# Patient Record
Sex: FEMALE | Race: WHITE | NOT HISPANIC OR LATINO | Employment: STUDENT | URBAN - METROPOLITAN AREA
[De-identification: names, ages, dates, MRNs, and addresses within clinical notes are randomized per-mention and may not be internally consistent; named-entity substitution may affect disease eponyms.]

---
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
## 2025-04-30 ENCOUNTER — OFFICE VISIT (OUTPATIENT)
Dept: OTOLARYNGOLOGY | Facility: CLINIC | Age: 20
End: 2025-04-30
Payer: MEDICAID

## 2025-04-30 VITALS — WEIGHT: 182.75 LBS

## 2025-04-30 DIAGNOSIS — H73.891 RETRACTION OF TYMPANIC MEMBRANE OF RIGHT EAR: ICD-10-CM

## 2025-04-30 DIAGNOSIS — H69.91 DYSFUNCTION OF RIGHT EUSTACHIAN TUBE: ICD-10-CM

## 2025-04-30 DIAGNOSIS — H61.21 IMPACTED CERUMEN OF RIGHT EAR: ICD-10-CM

## 2025-04-30 DIAGNOSIS — H92.11 OTORRHEA OF RIGHT EAR: Primary | ICD-10-CM

## 2025-04-30 PROCEDURE — 87186 SC STD MICRODIL/AGAR DIL: CPT | Performed by: OTOLARYNGOLOGY

## 2025-04-30 PROCEDURE — 69210 REMOVE IMPACTED EAR WAX UNI: CPT | Mod: PBBFAC,PO | Performed by: OTOLARYNGOLOGY

## 2025-04-30 PROCEDURE — 99212 OFFICE O/P EST SF 10 MIN: CPT | Mod: PBBFAC,PO,25 | Performed by: OTOLARYNGOLOGY

## 2025-04-30 PROCEDURE — 69210 REMOVE IMPACTED EAR WAX UNI: CPT | Mod: S$PBB,,, | Performed by: OTOLARYNGOLOGY

## 2025-04-30 PROCEDURE — 1159F MED LIST DOCD IN RCRD: CPT | Mod: CPTII,,, | Performed by: OTOLARYNGOLOGY

## 2025-04-30 PROCEDURE — 99999 PR PBB SHADOW E&M-EST. PATIENT-LVL II: CPT | Mod: PBBFAC,,, | Performed by: OTOLARYNGOLOGY

## 2025-04-30 PROCEDURE — 99204 OFFICE O/P NEW MOD 45 MIN: CPT | Mod: 25,S$PBB,, | Performed by: OTOLARYNGOLOGY

## 2025-04-30 RX ORDER — NEOMYCIN SULFATE, POLYMYXIN B SULFATE, HYDROCORTISONE 3.5; 10000; 1 MG/ML; [USP'U]/ML; MG/ML
4 SOLUTION/ DROPS AURICULAR (OTIC) 3 TIMES DAILY
Qty: 10 ML | Refills: 0 | Status: SHIPPED | OUTPATIENT
Start: 2025-04-30 | End: 2025-05-14

## 2025-05-03 LAB — BACTERIA SPEC AEROBE CULT: ABNORMAL

## 2025-05-26 ENCOUNTER — TELEPHONE (OUTPATIENT)
Dept: OTOLARYNGOLOGY | Facility: CLINIC | Age: 20
End: 2025-05-26
Payer: MEDICAID

## 2025-05-26 ENCOUNTER — CLINICAL SUPPORT (OUTPATIENT)
Dept: AUDIOLOGY | Facility: CLINIC | Age: 20
End: 2025-05-26
Payer: MEDICAID

## 2025-05-26 ENCOUNTER — OFFICE VISIT (OUTPATIENT)
Dept: OTOLARYNGOLOGY | Facility: CLINIC | Age: 20
End: 2025-05-26
Payer: MEDICAID

## 2025-05-26 VITALS — WEIGHT: 180.75 LBS | BODY MASS INDEX: 31.03 KG/M2

## 2025-05-26 DIAGNOSIS — H90.11 CONDUCTIVE HEARING LOSS OF RIGHT EAR WITH UNRESTRICTED HEARING OF LEFT EAR: ICD-10-CM

## 2025-05-26 DIAGNOSIS — H71.91 CHOLESTEATOMA OF RIGHT EAR: Primary | ICD-10-CM

## 2025-05-26 DIAGNOSIS — H69.91 DYSFUNCTION OF RIGHT EUSTACHIAN TUBE: ICD-10-CM

## 2025-05-26 DIAGNOSIS — H73.891 RETRACTION OF TYMPANIC MEMBRANE OF RIGHT EAR: ICD-10-CM

## 2025-05-26 DIAGNOSIS — H90.0 CONDUCTIVE HEARING LOSS, BILATERAL: Primary | ICD-10-CM

## 2025-05-26 DIAGNOSIS — H92.11 OTORRHEA OF RIGHT EAR: ICD-10-CM

## 2025-05-26 PROCEDURE — 99999 PR PBB SHADOW E&M-EST. PATIENT-LVL II: CPT | Mod: PBBFAC,,, | Performed by: OTOLARYNGOLOGY

## 2025-05-26 PROCEDURE — 1159F MED LIST DOCD IN RCRD: CPT | Mod: CPTII,,, | Performed by: OTOLARYNGOLOGY

## 2025-05-26 PROCEDURE — 99214 OFFICE O/P EST MOD 30 MIN: CPT | Mod: S$PBB,,, | Performed by: OTOLARYNGOLOGY

## 2025-05-26 PROCEDURE — 92567 TYMPANOMETRY: CPT | Mod: PBBFAC,PO

## 2025-05-26 PROCEDURE — 92557 COMPREHENSIVE HEARING TEST: CPT | Mod: PBBFAC,PO

## 2025-05-26 PROCEDURE — 3008F BODY MASS INDEX DOCD: CPT | Mod: CPTII,,, | Performed by: OTOLARYNGOLOGY

## 2025-05-26 PROCEDURE — 99212 OFFICE O/P EST SF 10 MIN: CPT | Mod: PBBFAC,PO | Performed by: OTOLARYNGOLOGY

## 2025-05-26 NOTE — PROGRESS NOTES
Pediatric Otolaryngology- Head & Neck Surgery   Established Patient Visit        Chief Complaint: Follow up of ear    HPI  Myrna Vargas is a 20 y.o. old female here for follow up for  chronic draining ear on the right.  Seen 2 weeks ago. Had R TM retraction vs. Perforation filled with granulation> Has been on cortisporin. Otorrhea resolved. Hearing has improved.       Had a similar episode years ago and was seen by Dr. Hopson in 2018 and ct done, no cholesteatoma at that time, but had retracted TM.  There is  an associated subjective hearing loss.  There is no dizziness or facial weakness. Parents describe this problem as moderate    No frequent water exposure. No known trauma to the ear.   This has  not been previously cultured.   .  No other risk factors.    Prior ear surgery: tubes x 1    Medical History  Past Medical History:   Diagnosis Date    Anxiety     Depression        Surgical History  Past Surgical History:   Procedure Laterality Date    ADENOIDECTOMY  2008    APPENDECTOMY      TYMPANOSTOMY TUBE PLACEMENT         Medications  Medications Ordered Prior to Encounter[1]    Allergies  Review of patient's allergies indicates:  No Known Allergies    Social History  There are no smokers in the home    Family History  No family history of bleeding disorder or problems with anesthesia         Physical Exam  General:  Alert, well developed, comfortable  Voice:  Regular for age, good volume  Respiratory:  Symmetric breathing, no stridor, no distress  Head:  Normocephalic, no lesions  Face: Symmetric, HB 1/6 bilat, no lesions, no obvious sinus tenderness, salivary glands nontender  Eyes:  Sclera white, extraocular movements intact  Nose: Dorsum straight, septum midline, normal turbinate size, normal mucosa  Right Ear: see below  Left Ear: Pinna and external ear appears normal, EAC clear, TM  intact, mobile, without middle ear effusion  Hearing:  Grossly intact  Oral cavity: Healthy mucosa, no masses or lesions  including lips, teeth, gums, floor of mouth, palate, or tongue.  Oropharynx: Tonsils 1+, palate intact, normal pharyngeal wall movement  Neck: Supple, no palpable nodes, no masses, trachea midline, no thyroid masses  Cardiovascular system:  Pulses regular in both upper extremities, good skin turgor     Studies Reviewed  Ct temporal bone 2018: no cholesteatoma    Procedures  Microscopy:  Right Ear: Pinna and external ear appears normal, EAC clear, TM w perforation and retracted TM     Flexible fiberoptic nasopharyngoscopy  Surgeon:  Lemuel Koo MD     Detail:  After confirming patient and verbal consent, the nose was anesthetized with topical lidocaine and afrin.  The flexible fiberoptic endoscope was passed through the left nostril revealing normal turbinates. There was no pus or polyps in the nasal cavity. The sope was then advanced to the nasopharynx revealing non obstructive adenoid tissue.  The flexible fiberoptic endoscope was passed through the right nostril revealing normal turbinates. There was no pus or polyps in the nasal cavity. The scope was then removed and the patient tolerated the procedure well.          Impression    1. Cholesteatoma of right ear  CT Temporal Bone without contrast      2. Retraction of tympanic membrane of right ear        3. Dysfunction of right eustachian tube        4. Conductive hearing loss of right ear with unrestricted hearing of left ear             Severe R TM retraction /preforation that was filled with grans. Now resolved with drops  . Need right lateral graft, will do eustachian tube dilation at same time to try and improve post op outcome- prevent TM from retracting severely    Treatment Plan  CT to rule out cholesteatoma  Lateral graft R tplasty and eustachian tube dilation      Discussed risks of tympanoplasty and eustachian tube dilation including bleeding, infection, autophony, , failure of perforation to close, loss of taste to part of tongue, dizziness and  imbalance, hearing loss and  facial nerver paralysis.  Possibility of persistent eustachian tube dysfunction resulting in middle ear problem subsequent to closure was also discussed    Lemuel Koo MD  Pediatric Otolaryngology Attending               [1]   Current Outpatient Medications on File Prior to Visit   Medication Sig Dispense Refill    FLUoxetine 10 MG capsule Take by mouth. (Patient not taking: Reported on 5/26/2025)      metFORMIN (GLUCOPHAGE) 500 MG tablet Take 1 tablet (500 mg total) by mouth daily with breakfast. 90 tablet 3    venlafaxine (EFFEXOR-XR) 37.5 MG 24 hr capsule Take 37.5 mg by mouth once daily. (Patient not taking: Reported on 5/26/2025)       No current facility-administered medications on file prior to visit.

## 2025-05-26 NOTE — PROGRESS NOTES
Myrna Vargas was seen on 05/26/2025 for a pre-op audiological evaluation. Patient was accompanied by mother during today's visit. Patient has a history of conductive hearing loss, bilaterally. Patient confirms early onset of genetic family history of hearing loss. Previous hearing evaluation on 02/12/2019 was consistent with mild rising to normal sloping to mild conductive hearing loss in the right ear and a normal to mild conductive hearing loss in the left ear. Otoscopy revealed minimal cerumen in both ears. The tympanic membrane was visualized AU prior to proceeding with the hearing testing.     Results reveal a normal-to-mild conductive hearing loss for the right ear and  mild-to-moderately severe conductive hearing loss for the left ear.   Speech Reception Thresholds were  45 dBHL for the right ear and 15 dBHL for the left ear.    Word recognition scores were excellent for the right ear and excellent for the left ear.  Of note, there was a significant decrease of hearing sensitivity in the right ear when compared to previous audiogram. Tympanograms were Type B for the right ear and Type B for the left ear.    The recommendations were as follows:  (1) Hearing evaluation following medical intervention  (2) Ear protection in loud noise      Today's test results and recommendations were discussed with the patient.

## 2025-05-29 ENCOUNTER — PATIENT MESSAGE (OUTPATIENT)
Dept: OTOLARYNGOLOGY | Facility: CLINIC | Age: 20
End: 2025-05-29
Payer: MEDICAID

## 2025-05-29 ENCOUNTER — HOSPITAL ENCOUNTER (OUTPATIENT)
Dept: RADIOLOGY | Facility: HOSPITAL | Age: 20
Discharge: HOME | End: 2025-05-29
Attending: OTOLARYNGOLOGY
Payer: MEDICAID

## 2025-05-29 DIAGNOSIS — H71.91 CHOLESTEATOMA OF RIGHT EAR: ICD-10-CM

## 2025-05-29 PROCEDURE — 70480 CT ORBIT/EAR/FOSSA W/O DYE: CPT | Mod: TC,PO

## 2025-05-29 PROCEDURE — 70480 CT ORBIT/EAR/FOSSA W/O DYE: CPT | Mod: 26,,, | Performed by: RADIOLOGY

## 2025-06-17 NOTE — PRE-PROCEDURE INSTRUCTIONS
PreOp Instructions given:    -- Medication information (what to hold and what to take)   -- NPO guidelines as follows: (or as per your Surgeon)  Stop ALL solid food, gum, candy 8 hours before arrival time.  Stop all CLOUDY liquids: coffee with creamer, cloudy juices, 8 hours prior to arrival time.  The patient should be ENCOURAGED to drink carbohydrate-rich clear liquids (sports drinks, clear juices) until 2 hours prior to arrival time.  Stop clear liquids 2 hours prior to arrival time.  CLEAR liquids include water, black coffee NO creamer, clear oral rehydration drinks, clear sports drinks and clear fruit juices (no orange juice, no pulpy juices, no apple cider).   IF IN DOUBT, drink water instead.   NOTHING TO DRINK 2 hours before to surgery/procedure  time. If you are told to take medication on the morning of surgery, it may be taken with a sip of water.   -- *Arrival place and directions given*.  Time to be given the day before procedure by the Surgeon's Office   -- Bathe with antibacterial soap (dial or Hibiclens as instructed)  -- Don't wear any jewelry or valuables and not metals on skin or hair AM of surgery   -- No makeup or moisturizer to face   -- No perfume/cologne, powder, lotions, aftershave or deodorant    Pt verbalized understanding.         *If going to [facility], see below:     Directions and Instructions for [facility]   At [facility], we have an outstanding team of physicians, anesthesiologists, CRNAs, Registered Nurses, Surgical Technologists, and other ancillary team members all focused on your surgical and procedural care.   Before Your Procedure:   The physician's office will call you with a specific arrival time and directions a day or two before your scheduled procedure. You may also receive these instructions through your [facility] portal.   Day of Procedure:   Please be sure to arrive at the arrival time given or you may risk your  surgery being delayed or canceled. The arrival time is earlier than your scheduled surgery or procedure time. In the winter months please dress warm and bring blankets for you or your child as the waiting room may be cold. If you have difficulty locating the facility, please give us a call at [phone].   Directions:   The [facility] is located on the 1st floor of the hospital building near the [place] entrance.   Parking:   You will park in the [facility] (note location on map). [facility] opens at 5:00 a.m. and has a drop off area by the entrance.  parking is available starting at 7:00 a.m. Please see below for further  parking instructions.   Directions from the parking garage elevators   Blue [facility] Elevators: From the parking garage, take the blue [employer] elevators (located in the center of the parking garage) to the 1st floor of the garage. You will then take a right once off the elevators then another right to the outside of the parking garage. You will be across from the [facility]. You will walk down the sidewalk, pass the  curve at the [place] entrance and continue to follow the sidewalk. You will pass the radiation oncology entrance on your right. Continue to follow the sidewalk to the [facility] glass door entrance.   Hospital Entrance (Inside Route): If a mostly inside route is preferred: Take the inside elevator bank (located at the far north end of the garage) from the parking garage to the 1st floor. On the 1st floor walk past [employer]. Keep walking down the center of the hallway towards the hospital elevators. Once you reach the red brick ryanne, take a left and go past the hospital elevators. Take another left and follow the blue and white [employer] signs around the hallway to the end. Go outside of the door. You will see the [facility] entrance to your right.   Drop Off:    There is a drop off area at the doors of the [facility] surgery center for your convenience. If utilized for pediatric patients, an adult must accompany the patient into the surgery center while another adult nettles the vehicle.   [place] (at 7:00 a.m.):   Upon check-in, please let the  know that you are utilizing [employer] parking which is free. The . will then call [employer] for your car to be picked up. Your keys and phone number will be collected and given to [employer] services. You will then be given a ticket. Upon discharge, [employer] will be notified to bring your vehicle back when you are ready.   2/6/2024      If going to 2nd floor surgery center ([facility]), see below:    Directions to the 2nd floor ([facility]) Surgery Center  The hallway to get to the surgery center is on the 2nd fl between the gold elevators in the atrium.  Follow the hallway into the waiting room and check in at desk.

## 2025-06-18 ENCOUNTER — ANESTHESIA EVENT (OUTPATIENT)
Dept: SURGERY | Facility: HOSPITAL | Age: 20
End: 2025-06-18
Payer: MEDICAID

## 2025-06-18 ENCOUNTER — TELEPHONE (OUTPATIENT)
Dept: OTOLARYNGOLOGY | Facility: CLINIC | Age: 20
End: 2025-06-18
Payer: MEDICAID

## 2025-06-18 NOTE — ANESTHESIA PREPROCEDURE EVALUATION
[facility]  Anesthesia Pre-Operative Evaluation         Patient Name: Myrna Vargas  YOB: 2005  MRN: [id number]    SUBJECTIVE:     Pre-operative evaluation for Procedure(s) (LRB):  TYMPANOPLASTY LATERAL GRAFT (Right)  ENDOSCOPY, NOSE - DILATION, EUSTACHIAN TUBE, (Right)     06/18/2025    Myrna Vargas is a 20 y.o. female w/ a significant PMHx of anxiety, depression, obesity, R eustachian tube dysfunction.    Patient now presents for the above procedure(s).      LDA: None documented.       Prev airway: None documented.    Drips: None documented.      Problem List[1]    Review of patient's allergies indicates:  No Known Allergies    Current Inpatient Medications:      Medications Ordered Prior to Encounter[2]    Past Surgical History:   Procedure Laterality Date    ADENOIDECTOMY  2008    APPENDECTOMY      TYMPANOSTOMY TUBE PLACEMENT         Social History[3]    OBJECTIVE:     Vital Signs Range (Last 24H):         Significant Labs:  Lab Results   Component Value Date    WBC 8.20 03/30/2021    HGB 13.1 03/30/2021    HCT 38.7 03/30/2021     03/30/2021    CHOL 152 03/30/2021    TRIG 141 03/30/2021    HDL 50 03/30/2021    TSH 3.783 08/05/2022    GLUF 97 08/05/2022       Diagnostic Studies: No relevant studies.    EKG:   No results found for this or any previous visit.    2D ECHO:  TTE:  No results found for this or any previous visit.    ROBBIE:  No results found for this or any previous visit.    ASSESSMENT/PLAN:          Pre-op Assessment    I have reviewed the Patient Summary Reports.     I have reviewed the Nursing Notes. I have reviewed the NPO Status.   I have reviewed the Medications.     Review of Systems  Anesthesia Hx:  No problems with previous Anesthesia   History of prior surgery of interest to airway management or planning:             Hematology/Oncology:       -- Denies Anemia:                  Denies Current/Recent Cancer                EENT/Dental:          Otitis Media        Cardiovascular:      Denies Hypertension.    Denies CAD.        Denies CHF.    no hyperlipidemia                               Pulmonary:    Denies COPD.                     Renal/:   Denies Chronic Renal Disease.                Hepatic/GI:      Denies GERD.                Musculoskeletal:  Denies Arthritis.               Neurological:    Denies CVA.             Denies Dementia                         Endocrine:  Denies Diabetes.         Obesity / BMI > 30  Psych:  Psychiatric History anxiety depression                   Anesthesia Plan  Type of Anesthesia, risks & benefits discussed:    Anesthesia Type: Gen ETT  Intra-op Monitoring Plan: Standard ASA Monitors  Post Op Pain Control Plan: multimodal analgesia and IV/PO Opioids PRN  Induction:  IV  Airway Plan: Direct, Post-Induction  Informed Consent: Informed consent signed with the Patient and all parties understand the risks and agree with anesthesia plan.  All questions answered.   ASA Score: 2  Day of Surgery Review of History & Physical: H&P Update referred to the surgeon/provider.    Ready For Surgery From Anesthesia Perspective.     .           [1] There is no problem list on file for this patient.  [2]   No current facility-administered medications on file prior to encounter.     Current Outpatient Medications on File Prior to Encounter   Medication Sig Dispense Refill    FLUoxetine 10 MG capsule Take by mouth. (Patient not taking: Reported on 5/26/2025)      metFORMIN (GLUCOPHAGE) 500 MG tablet Take 1 tablet (500 mg total) by mouth daily with breakfast. (Patient not taking: Reported on 6/17/2025) 90 tablet 3    venlafaxine (EFFEXOR-XR) 37.5 MG 24 hr capsule Take 37.5 mg by mouth once daily. (Patient not taking: Reported on 5/26/2025)     [3]   Social History  Socioeconomic History    Marital status: Single   Occupational History    Occupation: student     Comment: Home schooled.   Tobacco Use    Smoking status: Never    Smokeless  tobacco: Never   Substance and Sexual Activity    Alcohol use: Never    Drug use: Never    Sexual activity: Never     Partners: Male   Social History Narrative    Home schooled. Lives with parents, two brothers.      Social Drivers of Health     Financial Resource Strain: High Risk (4/24/2025)    Overall Financial Resource Strain (CARDIA)     Difficulty of Paying Living Expenses: Hard   Food Insecurity: Food Insecurity Present (4/24/2025)    Hunger Vital Sign     Worried About Running Out of Food in the Last Year: Often true     Ran Out of Food in the Last Year: Sometimes true   Transportation Needs: No Transportation Needs (4/24/2025)    PRAPARE - Transportation     Lack of Transportation (Medical): No     Lack of Transportation (Non-Medical): No   Physical Activity: Insufficiently Active (4/24/2025)    Exercise Vital Sign     Days of Exercise per Week: 3 days     Minutes of Exercise per Session: 20 min   Stress: Stress Concern Present (4/24/2025)    [employer] [place] [employer] - Occupational Stress Questionnaire     Feeling of Stress : Very much   Housing Stability: Low Risk  (4/24/2025)    Housing Stability Vital Sign     Unable to Pay for Housing in the Last Year: No     Number of Times Moved in the Last Year: 1     Homeless in the Last Year: No

## 2025-06-19 ENCOUNTER — ANESTHESIA (OUTPATIENT)
Dept: SURGERY | Facility: HOSPITAL | Age: 20
End: 2025-06-19
Payer: MEDICAID

## 2025-06-19 ENCOUNTER — HOSPITAL ENCOUNTER (OUTPATIENT)
Facility: HOSPITAL | Age: 20
Discharge: HOME | End: 2025-06-19
Attending: OTOLARYNGOLOGY | Admitting: OTOLARYNGOLOGY
Payer: MEDICAID

## 2025-06-19 VITALS
HEART RATE: 60 BPM | HEIGHT: 64 IN | DIASTOLIC BLOOD PRESSURE: 64 MMHG | TEMPERATURE: 98 F | OXYGEN SATURATION: 96 % | SYSTOLIC BLOOD PRESSURE: 122 MMHG | BODY MASS INDEX: 30.86 KG/M2 | RESPIRATION RATE: 16 BRPM | WEIGHT: 180.75 LBS

## 2025-06-19 DIAGNOSIS — H72.90 TYMPANIC MEMBRANE PERFORATION: ICD-10-CM

## 2025-06-19 LAB
B-HCG UR QL: NEGATIVE
CTP QC/QA: YES

## 2025-06-19 PROCEDURE — 37000008 HC ANESTHESIA 1ST 15 MINUTES: Performed by: OTOLARYNGOLOGY

## 2025-06-19 PROCEDURE — 71000015 HC POSTOP RECOV 1ST HR: Performed by: OTOLARYNGOLOGY

## 2025-06-19 PROCEDURE — 25000003 PHARM REV CODE 250: Performed by: OTOLARYNGOLOGY

## 2025-06-19 PROCEDURE — 36000709 HC OR TIME LEV III EA ADD 15 MIN: Performed by: OTOLARYNGOLOGY

## 2025-06-19 PROCEDURE — 25000003 PHARM REV CODE 250

## 2025-06-19 PROCEDURE — D9220A PRA ANESTHESIA: Mod: ,,, | Performed by: STUDENT IN AN ORGANIZED HEALTH CARE EDUCATION/TRAINING PROGRAM

## 2025-06-19 PROCEDURE — 69705 NPS SURG DILAT EUST TUBE UNI: CPT | Mod: 51,RT,, | Performed by: OTOLARYNGOLOGY

## 2025-06-19 PROCEDURE — 63600175 PHARM REV CODE 636 W HCPCS: Performed by: OTOLARYNGOLOGY

## 2025-06-19 PROCEDURE — 63600175 PHARM REV CODE 636 W HCPCS

## 2025-06-19 PROCEDURE — 69631 REPAIR EARDRUM STRUCTURES: CPT | Mod: RT,,, | Performed by: OTOLARYNGOLOGY

## 2025-06-19 PROCEDURE — 36000708 HC OR TIME LEV III 1ST 15 MIN: Performed by: OTOLARYNGOLOGY

## 2025-06-19 PROCEDURE — 27201423 OPTIME MED/SURG SUP & DEVICES STERILE SUPPLY: Performed by: OTOLARYNGOLOGY

## 2025-06-19 PROCEDURE — 81025 URINE PREGNANCY TEST: CPT | Performed by: OTOLARYNGOLOGY

## 2025-06-19 PROCEDURE — 37000009 HC ANESTHESIA EA ADD 15 MINS: Performed by: OTOLARYNGOLOGY

## 2025-06-19 PROCEDURE — 71000044 HC DOSC ROUTINE RECOVERY FIRST HOUR: Performed by: OTOLARYNGOLOGY

## 2025-06-19 PROCEDURE — C1726 CATH, BAL DIL, NON-VASCULAR: HCPCS | Performed by: OTOLARYNGOLOGY

## 2025-06-19 RX ORDER — EPINEPHRINE 1 MG/ML
INJECTION, SOLUTION, CONCENTRATE INTRAVENOUS
Status: DISCONTINUED
Start: 2025-06-19 | End: 2025-06-19 | Stop reason: HOSPADM

## 2025-06-19 RX ORDER — ACETAMINOPHEN 10 MG/ML
INJECTION, SOLUTION INTRAVENOUS
Status: DISCONTINUED | OUTPATIENT
Start: 2025-06-19 | End: 2025-06-19

## 2025-06-19 RX ORDER — OXYMETAZOLINE HCL 0.05 %
SPRAY, NON-AEROSOL (ML) NASAL
Status: DISCONTINUED
Start: 2025-06-19 | End: 2025-06-19 | Stop reason: HOSPADM

## 2025-06-19 RX ORDER — ONDANSETRON 4 MG/1
4 TABLET, ORALLY DISINTEGRATING ORAL EVERY 6 HOURS PRN
Qty: 20 TABLET | Refills: 0 | Status: SHIPPED | OUTPATIENT
Start: 2025-06-19 | End: 2025-06-26

## 2025-06-19 RX ORDER — NEOMYCIN SULFATE, POLYMYXIN B SULFATE AND HYDROCORTISONE 10; 3.5; 1 MG/ML; MG/ML; [USP'U]/ML
3 SUSPENSION/ DROPS AURICULAR (OTIC) 3 TIMES DAILY
Qty: 10 ML | Refills: 0 | Status: SHIPPED | OUTPATIENT
Start: 2025-07-10 | End: 2025-07-17

## 2025-06-19 RX ORDER — DEXMEDETOMIDINE HYDROCHLORIDE 100 UG/ML
INJECTION, SOLUTION INTRAVENOUS
Status: DISCONTINUED | OUTPATIENT
Start: 2025-06-19 | End: 2025-06-19

## 2025-06-19 RX ORDER — ACETAMINOPHEN 160 MG/5ML
10 SOLUTION ORAL EVERY 6 HOURS PRN
Status: DISCONTINUED | OUTPATIENT
Start: 2025-06-19 | End: 2025-06-19 | Stop reason: HOSPADM

## 2025-06-19 RX ORDER — LIDOCAINE HYDROCHLORIDE AND EPINEPHRINE 10; 10 UG/ML; MG/ML
INJECTION, SOLUTION INFILTRATION; PERINEURAL
Status: DISCONTINUED | OUTPATIENT
Start: 2025-06-19 | End: 2025-06-19 | Stop reason: HOSPADM

## 2025-06-19 RX ORDER — SUCCINYLCHOLINE CHLORIDE 20 MG/ML
INJECTION INTRAMUSCULAR; INTRAVENOUS
Status: DISCONTINUED | OUTPATIENT
Start: 2025-06-19 | End: 2025-06-19

## 2025-06-19 RX ORDER — HYDROMORPHONE HYDROCHLORIDE 1 MG/ML
INJECTION, SOLUTION INTRAMUSCULAR; INTRAVENOUS; SUBCUTANEOUS
Status: DISCONTINUED | OUTPATIENT
Start: 2025-06-19 | End: 2025-06-19

## 2025-06-19 RX ORDER — LIDOCAINE HYDROCHLORIDE AND EPINEPHRINE 10; 10 UG/ML; MG/ML
INJECTION, SOLUTION INFILTRATION; PERINEURAL
Status: DISCONTINUED
Start: 2025-06-19 | End: 2025-06-19 | Stop reason: HOSPADM

## 2025-06-19 RX ORDER — DEXAMETHASONE SODIUM PHOSPHATE 4 MG/ML
INJECTION, SOLUTION INTRA-ARTICULAR; INTRALESIONAL; INTRAMUSCULAR; INTRAVENOUS; SOFT TISSUE
Status: DISCONTINUED | OUTPATIENT
Start: 2025-06-19 | End: 2025-06-19

## 2025-06-19 RX ORDER — CIPROFLOXACIN AND FLUOCINOLONE ACETONIDE .75; .0625 MG/.25ML; MG/.25ML
SOLUTION AURICULAR (OTIC)
Status: DISCONTINUED | OUTPATIENT
Start: 2025-06-19 | End: 2025-06-19 | Stop reason: HOSPADM

## 2025-06-19 RX ORDER — SODIUM CHLORIDE 9 MG/ML
INJECTION, SOLUTION INTRAVENOUS CONTINUOUS
Status: DISCONTINUED | OUTPATIENT
Start: 2025-06-19 | End: 2025-06-19 | Stop reason: HOSPADM

## 2025-06-19 RX ORDER — ONDANSETRON HYDROCHLORIDE 2 MG/ML
INJECTION, SOLUTION INTRAVENOUS
Status: DISCONTINUED | OUTPATIENT
Start: 2025-06-19 | End: 2025-06-19

## 2025-06-19 RX ORDER — GLUCAGON 1 MG
1 KIT INJECTION
Status: DISCONTINUED | OUTPATIENT
Start: 2025-06-19 | End: 2025-06-19 | Stop reason: HOSPADM

## 2025-06-19 RX ORDER — CIPROFLOXACIN AND DEXAMETHASONE 3; 1 MG/ML; MG/ML
SUSPENSION/ DROPS AURICULAR (OTIC)
Status: DISCONTINUED | OUTPATIENT
Start: 2025-06-19 | End: 2025-06-19 | Stop reason: HOSPADM

## 2025-06-19 RX ORDER — HYDROMORPHONE HYDROCHLORIDE 1 MG/ML
0.2 INJECTION, SOLUTION INTRAMUSCULAR; INTRAVENOUS; SUBCUTANEOUS
Status: DISCONTINUED | OUTPATIENT
Start: 2025-06-19 | End: 2025-06-19 | Stop reason: HOSPADM

## 2025-06-19 RX ORDER — PROPOFOL 10 MG/ML
VIAL (ML) INTRAVENOUS CONTINUOUS PRN
Status: DISCONTINUED | OUTPATIENT
Start: 2025-06-19 | End: 2025-06-19

## 2025-06-19 RX ORDER — FENTANYL CITRATE 50 UG/ML
INJECTION, SOLUTION INTRAMUSCULAR; INTRAVENOUS
Status: DISCONTINUED | OUTPATIENT
Start: 2025-06-19 | End: 2025-06-19

## 2025-06-19 RX ORDER — CEFAZOLIN 2 G/1
INJECTION, POWDER, FOR SOLUTION INTRAMUSCULAR; INTRAVENOUS
Status: DISCONTINUED | OUTPATIENT
Start: 2025-06-19 | End: 2025-06-19

## 2025-06-19 RX ORDER — IBUPROFEN 400 MG/1
400 TABLET, FILM COATED ORAL EVERY 4 HOURS PRN
Qty: 84 TABLET | Refills: 0 | Status: SHIPPED | OUTPATIENT
Start: 2025-06-19 | End: 2025-07-03

## 2025-06-19 RX ORDER — OXYMETAZOLINE HCL 0.05 %
SPRAY, NON-AEROSOL (ML) NASAL
Status: DISCONTINUED | OUTPATIENT
Start: 2025-06-19 | End: 2025-06-19 | Stop reason: HOSPADM

## 2025-06-19 RX ORDER — EPINEPHRINE 1 MG/ML
INJECTION, SOLUTION, CONCENTRATE INTRAVENOUS
Status: DISCONTINUED | OUTPATIENT
Start: 2025-06-19 | End: 2025-06-19 | Stop reason: HOSPADM

## 2025-06-19 RX ORDER — AMOXICILLIN AND CLAVULANATE POTASSIUM 875; 125 MG/1; MG/1
1 TABLET, FILM COATED ORAL EVERY 12 HOURS
Qty: 14 TABLET | Refills: 0 | Status: SHIPPED | OUTPATIENT
Start: 2025-06-19 | End: 2025-06-26

## 2025-06-19 RX ORDER — LIDOCAINE HYDROCHLORIDE 20 MG/ML
INJECTION, SOLUTION EPIDURAL; INFILTRATION; INTRACAUDAL; PERINEURAL
Status: DISCONTINUED | OUTPATIENT
Start: 2025-06-19 | End: 2025-06-19

## 2025-06-19 RX ORDER — IBUPROFEN 400 MG/1
400 TABLET, FILM COATED ORAL ONCE
Status: COMPLETED | OUTPATIENT
Start: 2025-06-19 | End: 2025-06-19

## 2025-06-19 RX ORDER — IBUPROFEN 400 MG/1
TABLET, FILM COATED ORAL
Status: DISCONTINUED
Start: 2025-06-19 | End: 2025-06-19 | Stop reason: HOSPADM

## 2025-06-19 RX ORDER — MIDAZOLAM HYDROCHLORIDE 1 MG/ML
INJECTION INTRAMUSCULAR; INTRAVENOUS
Status: DISCONTINUED | OUTPATIENT
Start: 2025-06-19 | End: 2025-06-19

## 2025-06-19 RX ORDER — HYDROCODONE BITARTRATE AND ACETAMINOPHEN 5; 325 MG/1; MG/1
1 TABLET ORAL EVERY 6 HOURS PRN
Qty: 28 TABLET | Refills: 0 | Status: SHIPPED | OUTPATIENT
Start: 2025-06-19 | End: 2025-06-26

## 2025-06-19 RX ORDER — SODIUM CHLORIDE 0.9 % (FLUSH) 0.9 %
10 SYRINGE (ML) INJECTION
Status: DISCONTINUED | OUTPATIENT
Start: 2025-06-19 | End: 2025-06-19 | Stop reason: HOSPADM

## 2025-06-19 RX ORDER — CIPROFLOXACIN AND DEXAMETHASONE 3; 1 MG/ML; MG/ML
SUSPENSION/ DROPS AURICULAR (OTIC)
Status: DISCONTINUED
Start: 2025-06-19 | End: 2025-06-19 | Stop reason: HOSPADM

## 2025-06-19 RX ADMIN — HYDROMORPHONE HYDROCHLORIDE 0.3 MG: 1 INJECTION, SOLUTION INTRAMUSCULAR; INTRAVENOUS; SUBCUTANEOUS at 02:06

## 2025-06-19 RX ADMIN — PROPOFOL 200 MCG/KG/MIN: 10 INJECTION, EMULSION INTRAVENOUS at 12:06

## 2025-06-19 RX ADMIN — HYDROMORPHONE HYDROCHLORIDE 0.2 MG: 1 INJECTION, SOLUTION INTRAMUSCULAR; INTRAVENOUS; SUBCUTANEOUS at 02:06

## 2025-06-19 RX ADMIN — CEFAZOLIN 2 G: 2 INJECTION, POWDER, FOR SOLUTION INTRAMUSCULAR; INTRAVENOUS at 12:06

## 2025-06-19 RX ADMIN — GLYCOPYRROLATE 0.2 MG: 0.2 INJECTION, SOLUTION INTRAMUSCULAR; INTRAVENOUS at 12:06

## 2025-06-19 RX ADMIN — SODIUM CHLORIDE: 9 INJECTION, SOLUTION INTRAVENOUS at 12:06

## 2025-06-19 RX ADMIN — MIDAZOLAM HYDROCHLORIDE 2 MG: 2 INJECTION, SOLUTION INTRAMUSCULAR; INTRAVENOUS at 12:06

## 2025-06-19 RX ADMIN — LIDOCAINE HYDROCHLORIDE 100 MG: 20 INJECTION, SOLUTION EPIDURAL; INFILTRATION; INTRACAUDAL; PERINEURAL at 01:06

## 2025-06-19 RX ADMIN — SODIUM CHLORIDE: 0.9 INJECTION, SOLUTION INTRAVENOUS at 12:06

## 2025-06-19 RX ADMIN — SODIUM CHLORIDE, SODIUM LACTATE, POTASSIUM CHLORIDE, AND CALCIUM CHLORIDE: .6; .31; .03; .02 INJECTION, SOLUTION INTRAVENOUS at 02:06

## 2025-06-19 RX ADMIN — DEXMEDETOMIDINE 8 MCG: 100 INJECTION, SOLUTION, CONCENTRATE INTRAVENOUS at 01:06

## 2025-06-19 RX ADMIN — DEXMEDETOMIDINE 8 MCG: 100 INJECTION, SOLUTION, CONCENTRATE INTRAVENOUS at 02:06

## 2025-06-19 RX ADMIN — DEXMEDETOMIDINE 8 MCG: 100 INJECTION, SOLUTION, CONCENTRATE INTRAVENOUS at 12:06

## 2025-06-19 RX ADMIN — ACETAMINOPHEN 1000 MG: 10 INJECTION INTRAVENOUS at 01:06

## 2025-06-19 RX ADMIN — HYDROMORPHONE HYDROCHLORIDE 0.5 MG: 1 INJECTION, SOLUTION INTRAMUSCULAR; INTRAVENOUS; SUBCUTANEOUS at 01:06

## 2025-06-19 RX ADMIN — SODIUM CHLORIDE: 0.9 INJECTION, SOLUTION INTRAVENOUS at 01:06

## 2025-06-19 RX ADMIN — ONDANSETRON 4 MG: 2 INJECTION INTRAMUSCULAR; INTRAVENOUS at 12:06

## 2025-06-19 RX ADMIN — DEXAMETHASONE SODIUM PHOSPHATE 8 MG: 4 INJECTION, SOLUTION INTRAMUSCULAR; INTRAVENOUS at 12:06

## 2025-06-19 RX ADMIN — FENTANYL CITRATE 50 MCG: 50 INJECTION, SOLUTION INTRAMUSCULAR; INTRAVENOUS at 12:06

## 2025-06-19 RX ADMIN — SUCCINYLCHOLINE 80 MG: 20 INJECTION, SOLUTION INTRAMUSCULAR; INTRAVENOUS at 12:06

## 2025-06-19 RX ADMIN — IBUPROFEN 400 MG: 400 TABLET ORAL at 04:06

## 2025-06-19 RX ADMIN — FENTANYL CITRATE 50 MCG: 50 INJECTION, SOLUTION INTRAMUSCULAR; INTRAVENOUS at 01:06

## 2025-06-19 RX ADMIN — PROPOFOL 200 MG: 10 INJECTION, EMULSION INTRAVENOUS at 12:06

## 2025-06-19 NOTE — ANESTHESIA PROCEDURE NOTES
Intubation    Date/Time: 6/19/2025 12:36 PM    Performed by: Oneal Arteaga MD  Authorized by: Prosper Garibay MD    Intubation:     Induction:  Intravenous    Intubated:  Postinduction    Mask Ventilation:  Easy mask    Attempts:  1    Attempted By:  Resident anesthesiologist    Method of Intubation:  Direct    Blade:  Laura 3    Laryngeal View Grade: Grade I - full view of cords      Difficult Airway Encountered?: No      Complications:  None    Airway Device:  Oral endotracheal tube    Airway Device Size:  7.0    Style/Cuff Inflation:  Cuffed (inflated to minimal occlusive pressure)    Tube secured:  21    Secured at:  The teeth    Placement Verified By:  Capnometry    Complicating Factors:  None    Findings Post-Intubation:  BS equal bilateral and atraumatic/condition of teeth unchanged

## 2025-06-19 NOTE — TRANSFER OF CARE
"Anesthesia Transfer of Care Note    Patient: Myrna Vargas    Procedure(s) Performed: Procedure(s) (LRB):  TYMPANOPLASTY LATERAL GRAFT (Right)  ENDOSCOPY, NOSE - DILATION, EUSTACHIAN TUBE, (Right)    Patient location: PACU    Anesthesia Type: general    Transport from OR: Transported from OR on 6-10 L/min O2 by face mask with adequate spontaneous ventilation    Post pain: adequate analgesia    Post assessment: no apparent anesthetic complications    Post vital signs: stable    Level of consciousness: awake    Nausea/Vomiting: no nausea/vomiting    Complications: none    Transfer of care protocol was followed      Last vitals: Visit Vitals  /65 (BP Location: Left arm, Patient Position: Lying)   Pulse 67   Temp 36.6 °C (97.9 °F) (Temporal)   Resp 16   Ht 5' 4" (1.626 m)   Wt 82 kg (180 lb 12.4 oz)   LMP 05/14/2025 (Approximate)   SpO2 96%   Breastfeeding No   BMI 31.03 kg/m²     "

## 2025-06-19 NOTE — DISCHARGE INSTRUCTIONS
Tympanoplasty Post Op Instructions  Lemuel Koo M.D.        DO NOT CALL [facility] ON CALL FOR POSTOPERATIVE PROBLEMS. CALL CLINIC [phone] OR THE  [phone] AND ASK FOR ENT ON CALL        What should be expected following a Tympanoplasty?    There may be drainage from your child's ears and may have packing fall out of the ear. If the drainage looks like pus let you doctor know.  Hearing may seem worse because of packing in the ear.  Your child may experience nausea, vomiting, and/or fatigue for a few hours after surgery, but this is unusual. Most children are recovered by the time they leave the hospital or surgery center. Your child should be able to progress to a normal diet when you return home.  Your child will be prescribed ear drops after surgery. Start the drops one week after surgery.  There may be mild ear pain after surgery. This can be treated with acetaminophen or ibuprofen.  A post-operative appointment with  will be scheduled for about three to four weeks after surgery.   Keep the ear dry after surgery, place a cotton ball in the ear to shower. Ear must be dry for 6 weeks post op.  No PE class for 3 weeks after surgery.  Use open mouth sneezing, coughing. Do not blow nose for 3 weeks.

## 2025-06-20 NOTE — DISCHARGE SUMMARY
[facility] - [facility] (1st Fl)  Discharge Note  Short Stay    Procedure(s) (LRB):  TYMPANOPLASTY LATERAL GRAFT (Right)  ENDOSCOPY, NOSE - DILATION, EUSTACHIAN TUBE, (Right)      OUTCOME: Patient tolerated treatment/procedure well without complication and is now ready for discharge.    DISPOSITION: Home or Self Care    FINAL DIAGNOSIS:  TM perforation    FOLLOWUP: In clinic    DISCHARGE INSTRUCTIONS:    Discharge Procedure Orders   Return to Emergency Department for intractable nausea, vomiting, pain or bleeding     Advance diet as tolerated     Activity order - Light Activity    Order Comments: For 2 weeks

## 2025-06-20 NOTE — OP NOTE
Otolaryngology- Head & Neck Surgery  Operative Report      Myrna Vargas  [id number]  2005    Date of surgery: 6/19/25    Preoperative Diagnosis:    Tympanic membrane perforation,  right ear   Conductive hearing loss, right ear  Eustachian tube dysfunction    Postoperative Diagnosis:   same    Procedure:    Right Tympanoplasty, lateral graft  Canaloplasty , right  Nasal endoscopy with eustachian tube dilation, right    Attending:  Lemuel Koo MD    Assist: none    Anesthesia: General endotracheal    Fluids:  Crystalloid, per anesthesia    EBL: 10 ml    Complications: None    Findings:    - near total perforation , remainder of TM severely retracted  - Ossicular chain: malleus medially retracted to promontory but chain mobile  - Prosthesis:  None  - Facial nerve dehiscence?:  No       Specimen:   None    Disposition: Stable, to PACU         Description of Procedure:  Patient was brought to the operating room and placed on the table in supine position.  GETA was obtained.  The eyes were taped shut and a timeout was performed. The table was turned 180 degrees. The patient was then prepped and draped in sterile fashion.    First attention turned towards eustachian tube dilation. Next, nose was decongested with afrin pledgets. The inferior turbinate was lateralized with an elevator in the right nose. Next, endoscopy was advanced.  There was no pus or polyps in the nasal cavity. The osteomeatal complex was normal as well as the frontal recess. The scope was advanced and there was no disease of the sphenoethmoid recess. The scope was then advanced to the nasopharynx revealing non obstructive adenoid tissue.   Next, the eustachian tube balloon was placed into the eustachian tube and dilated for 2 minutes. Balloon then deflated and removed.     Next, the right ear was examined with the operative microscope. Cerumen was cleaned with the currette.    The ear canal was injected with 1% lidocaine, 1:100,000 epinephrine  in the posterior superior quadrant.  The post auricular incision was also injected. The vascular strip incisions were made  with a round knife, and carried approximately 4 mm lateral to annulus.       Next, a post auricular incision was carried out.  This was carried down to the temporalis fascia (aponeurosis), and a  piece was harvested and set aside to dry. Next, a reverse 7 type incision was carried out through the periosteum and the periosteum was bluntly elevated down the ear canal and connected to previous canal incisions. The vascular strip was reflected forward and retractors placed.      Next a   blade was used to make and anterior canal wall incision and the canal skin was elevated down the level of the TM and the squamous layer of the TM which was reflected and peeled from the fibrous layer which was left intact. .  The middle ear showed  a mobile ossicular chain.       Next, attention was turned towards canoplasty. A  drill was used to perform this, enlarging the canal superiorly, inferiorly and anteriorily. The middle ear was packed with Gelfoam. The previously  Harvested graft was trimmed and secured underneath the malleus, and lateral to the annulus. Next, skin was place overlapping the graft. Ciprodex gelfoam was then used to pack the ear. The post auricular incisions were closed with 4-0 vicyl .Dermabond was applied.       At the end of the procedure, the patient was awakened from anesthesia, extubated without difficulty, and transferred to the PACU in good condition.       Lemuel Koo MD was scrubbed and actively participated in the entire procedure.

## 2025-06-22 NOTE — ANESTHESIA POSTPROCEDURE EVALUATION
Anesthesia Post Evaluation    Patient: Myrna Vargas    Procedure(s) Performed: Procedure(s) (LRB):  TYMPANOPLASTY LATERAL GRAFT (Right)  ENDOSCOPY, NOSE - DILATION, EUSTACHIAN TUBE, (Right)    Final Anesthesia Type: general      Patient location during evaluation: PACU  Patient participation: Yes- Able to Participate  Level of consciousness: awake and alert  Post-procedure vital signs: reviewed and stable  Pain management: adequate  Airway patency: patent    PONV status at discharge: No PONV  Anesthetic complications: no      Cardiovascular status: blood pressure returned to baseline  Respiratory status: unassisted, spontaneous ventilation and room air  Hydration status: euvolemic  Follow-up not needed.              Vitals Value Taken Time   /64 06/19/25 16:46   Temp 36.7 °C (98 °F) 06/19/25 15:45   Pulse 63 06/19/25 16:49   Resp 16 06/19/25 16:45   SpO2 96 % 06/19/25 16:49   Vitals shown include unfiled device data.      No case tracking events are documented in the log.      Pain/Kenji Score: No data recorded

## 2025-06-24 ENCOUNTER — PATIENT MESSAGE (OUTPATIENT)
Dept: OTOLARYNGOLOGY | Facility: CLINIC | Age: 20
End: 2025-06-24
Payer: MEDICAID

## 2025-07-23 ENCOUNTER — OFFICE VISIT (OUTPATIENT)
Dept: OTOLARYNGOLOGY | Facility: CLINIC | Age: 20
End: 2025-07-23
Payer: MEDICAID

## 2025-07-23 VITALS — BODY MASS INDEX: 31.03 KG/M2 | WEIGHT: 180.75 LBS

## 2025-07-23 DIAGNOSIS — H69.91 DYSFUNCTION OF RIGHT EUSTACHIAN TUBE: Primary | ICD-10-CM

## 2025-07-23 PROCEDURE — 99212 OFFICE O/P EST SF 10 MIN: CPT | Mod: PBBFAC,PO | Performed by: OTOLARYNGOLOGY

## 2025-07-23 PROCEDURE — 1159F MED LIST DOCD IN RCRD: CPT | Mod: CPTII,,, | Performed by: OTOLARYNGOLOGY

## 2025-07-23 PROCEDURE — 99024 POSTOP FOLLOW-UP VISIT: CPT | Mod: ,,, | Performed by: OTOLARYNGOLOGY

## 2025-07-23 PROCEDURE — 99999 PR PBB SHADOW E&M-EST. PATIENT-LVL II: CPT | Mod: PBBFAC,,, | Performed by: OTOLARYNGOLOGY

## 2025-07-23 NOTE — PROGRESS NOTES
Pediatric Otolaryngology- Head & Neck Surgery   Established Patient Visit        Chief Complaint: Follow up of ear    HPI  Myrna Vargas is a 20 y.o. old female here for follow up for right lateral graft/ eustachian tube dilation. Hearing down but otherwise ok          Prior ear surgery: tubes x 1, R tplasty    Medical History  Past Medical History:   Diagnosis Date    Anxiety     Depression        Surgical History  Past Surgical History:   Procedure Laterality Date    ADENOIDECTOMY  2008    APPENDECTOMY      NASAL ENDOSCOPY Right 6/19/2025    Procedure: ENDOSCOPY, NOSE - DILATION, EUSTACHIAN TUBE,;  Surgeon: Lemuel Koo MD;  Location: [facility] OR [address] [place] [address];  Service: ENT;  Laterality: Right;    TYMPANOPLASTY Right 6/19/2025    Procedure: TYMPANOPLASTY LATERAL GRAFT;  Surgeon: Lemuel Koo MD;  Location: [facility] OR [address] [place] [address];  Service: ENT;  Laterality: Right;  MICROSCOPE    TYMPANOSTOMY TUBE PLACEMENT         Medications  Medications Ordered Prior to Encounter[1]    Allergies  Review of patient's allergies indicates:  No Known Allergies    Social History  There are no smokers in the home    Family History  No family history of bleeding disorder or problems with anesthesia         Physical Exam  General:  Alert, well developed, comfortable  Voice:  Regular for age, good volume  Respiratory:  Symmetric breathing, no stridor, no distress  Head:  Normocephalic, no lesions  Face: Symmetric, HB 1/6 bilat, no lesions, no obvious sinus tenderness, salivary glands nontender  Eyes:  Sclera white, extraocular movements intact  Nose: Dorsum straight, septum midline, normal turbinate size, normal mucosa  Right Ear: see below  Left Ear: Pinna and external ear appears normal, EAC clear, TM  intact, mobile, without middle ear effusion  Hearing:  Grossly intact  Oral cavity: Healthy mucosa, no masses or lesions including lips, teeth, gums, floor of mouth, palate, or tongue.  Oropharynx: Tonsils 1+, palate intact, normal  pharyngeal wall movement  Neck: Supple, no palpable nodes, no masses, trachea midline, no thyroid masses  Cardiovascular system:  Pulses regular in both upper extremities, good skin turgor     Studies Reviewed  Ct temporal bone 2018: no cholesteatoma    Procedures  Microscopy:  Right Ear: Pinna and external ear appears normal, EAC clear, packing suctioned. TM retracting posteriorly and anteriorly         Impression           S/p R right lateral graft/ eustachian tube dilation. Tm intact but already retracting posteriorly and anteriorly. Will need to closely monitor    Treatment Plan  Recheck 1 mo  Autoinsufflate starting in 2 weeks    Lemuel Koo MD  Pediatric Otolaryngology Attending                 [1]   Current Outpatient Medications on File Prior to Visit   Medication Sig Dispense Refill    FLUoxetine 10 MG capsule Take by mouth.      venlafaxine (EFFEXOR-XR) 37.5 MG 24 hr capsule Take 37.5 mg by mouth once daily.      metFORMIN (GLUCOPHAGE) 500 MG tablet Take 1 tablet (500 mg total) by mouth daily with breakfast. (Patient not taking: Reported on 6/17/2025) 90 tablet 3     No current facility-administered medications on file prior to visit.

## 2025-08-25 ENCOUNTER — OFFICE VISIT (OUTPATIENT)
Dept: OTOLARYNGOLOGY | Facility: CLINIC | Age: 20
End: 2025-08-25
Payer: MEDICAID

## 2025-08-25 VITALS — WEIGHT: 180.75 LBS | BODY MASS INDEX: 31.03 KG/M2

## 2025-08-25 DIAGNOSIS — H69.91 DYSFUNCTION OF RIGHT EUSTACHIAN TUBE: Primary | ICD-10-CM

## 2025-08-25 PROCEDURE — 99024 POSTOP FOLLOW-UP VISIT: CPT | Mod: ,,, | Performed by: OTOLARYNGOLOGY

## 2025-08-25 PROCEDURE — 99999 PR PBB SHADOW E&M-EST. PATIENT-LVL II: CPT | Mod: PBBFAC,,, | Performed by: OTOLARYNGOLOGY

## 2025-08-25 PROCEDURE — 99212 OFFICE O/P EST SF 10 MIN: CPT | Mod: PBBFAC,PO | Performed by: OTOLARYNGOLOGY

## 2025-08-25 PROCEDURE — 1159F MED LIST DOCD IN RCRD: CPT | Mod: CPTII,,, | Performed by: OTOLARYNGOLOGY

## (undated) DEVICE — SYR SLIP TIP 1CC

## (undated) DEVICE — DRESSING SURGICAL 1X3

## (undated) DEVICE — KIT SINUS OMC

## (undated) DEVICE — DRAPE STERI 32 X 50

## (undated) DEVICE — KIT MEROCEL INSTRUMENT WIPE

## (undated) DEVICE — DRAPE EENT SPLIT STERILE

## (undated) DEVICE — CATH IV INTROCAN 14G X 2.

## (undated) DEVICE — DRAPE SMARTDRAPE MICROSCOPE

## (undated) DEVICE — DRAPE STERI-DRAPE 1000 17X11IN

## (undated) DEVICE — INSTRUMENT SURG SUCT FRZR W/C

## (undated) DEVICE — SYR 10CC LUER LOCK

## (undated) DEVICE — DRAIN PENROSE XRAY 12 X 1/4 ST

## (undated) DEVICE — DRESSING SURGICAL 1/2X1/2

## (undated) DEVICE — PAD CUSTOM COTTON 2 X 2

## (undated) DEVICE — BLADE SCALP OPHTL BEVEL STR

## (undated) DEVICE — KIT SUCTION IRRIGATION

## (undated) DEVICE — SUCTION SURGICAL FRAZR

## (undated) DEVICE — NDL HYPO 27G X 1 1/2

## (undated) DEVICE — CUP MEDICINE STERILE 2OZ

## (undated) DEVICE — TRAY SKIN SCRUB WET PREMIUM

## (undated) DEVICE — BLADE SURGICAL 15C

## (undated) DEVICE — TUBING COOLANT FOR HS DRILL

## (undated) DEVICE — BLADE SCALP OPTHL NDL TIP 3MM

## (undated) DEVICE — COVER PROXIMA MAYO STAND

## (undated) DEVICE — SUCTION SURGICAL STR 7FR

## (undated) DEVICE — KIT EAR EAOFE OMC

## (undated) DEVICE — SYR B-D DISP CONTROL 10CC100/C

## (undated) DEVICE — SUT MONOCRYL PLUS 5-0 PS-2

## (undated) DEVICE — SUT VICRYL PLUS 4-0 PS2 27

## (undated) DEVICE — SYR LUER LOCK 1CC

## (undated) DEVICE — ELECTRODE NDL

## (undated) DEVICE — SET EXTENSION STERILE 30IN

## (undated) DEVICE — SYS DILATION EUSTACHIAN AERA

## (undated) DEVICE — SOL NACL 0.9% IV INJ 1000ML

## (undated) DEVICE — TOWEL OR DISP STRL BLUE 4/PK

## (undated) DEVICE — CLIPPER BLADE MOD 4406 (CAREF)

## (undated) DEVICE — TUBING SUC UNIV W/CONN 12FT

## (undated) DEVICE — DEVICE INFLATION SE SINUS BLLN

## (undated) DEVICE — BURR ROUND CUTTING 3.0MM

## (undated) DEVICE — ELECTRODE MEGADYNE RETURN DUAL

## (undated) DEVICE — KIT ANTIFOG W/SPONG & FLUID